# Patient Record
Sex: MALE
[De-identification: names, ages, dates, MRNs, and addresses within clinical notes are randomized per-mention and may not be internally consistent; named-entity substitution may affect disease eponyms.]

---

## 2023-04-04 ENCOUNTER — NURSE TRIAGE (OUTPATIENT)
Dept: OTHER | Facility: CLINIC | Age: 58
End: 2023-04-04

## 2023-04-04 NOTE — TELEPHONE ENCOUNTER
Received a call from [facility] at [address] [place] [address] about patient's blood glucose of 504, drawn 4/3/23 at 955am. [place] [id number]    Writer notified on call Dr Paulette Wilson at 12 am  Reason for Disposition  [place] [id number] question, no triage required and triager able to answer question    Protocols used:  Information Only Call - No Triage-ADULT-